# Patient Record
Sex: FEMALE | Race: BLACK OR AFRICAN AMERICAN | NOT HISPANIC OR LATINO | Employment: OTHER | URBAN - METROPOLITAN AREA
[De-identification: names, ages, dates, MRNs, and addresses within clinical notes are randomized per-mention and may not be internally consistent; named-entity substitution may affect disease eponyms.]

---

## 2023-09-02 ENCOUNTER — HOSPITAL ENCOUNTER (EMERGENCY)
Facility: MEDICAL CENTER | Age: 58
End: 2023-09-02
Attending: EMERGENCY MEDICINE
Payer: COMMERCIAL

## 2023-09-02 ENCOUNTER — APPOINTMENT (OUTPATIENT)
Dept: RADIOLOGY | Facility: MEDICAL CENTER | Age: 58
End: 2023-09-02
Attending: EMERGENCY MEDICINE
Payer: COMMERCIAL

## 2023-09-02 VITALS
TEMPERATURE: 98.3 F | DIASTOLIC BLOOD PRESSURE: 68 MMHG | HEART RATE: 49 BPM | BODY MASS INDEX: 55.32 KG/M2 | SYSTOLIC BLOOD PRESSURE: 154 MMHG | RESPIRATION RATE: 17 BRPM | OXYGEN SATURATION: 96 % | HEIGHT: 61 IN | WEIGHT: 293 LBS

## 2023-09-02 DIAGNOSIS — R07.9 CHEST PAIN, UNSPECIFIED TYPE: ICD-10-CM

## 2023-09-02 DIAGNOSIS — R55 PRE-SYNCOPE: ICD-10-CM

## 2023-09-02 LAB
ALBUMIN SERPL BCP-MCNC: 4.4 G/DL (ref 3.2–4.9)
ALBUMIN/GLOB SERPL: 1.4 G/DL
ALP SERPL-CCNC: 98 U/L (ref 30–99)
ALT SERPL-CCNC: 12 U/L (ref 2–50)
ANION GAP SERPL CALC-SCNC: 11 MMOL/L (ref 7–16)
AST SERPL-CCNC: 19 U/L (ref 12–45)
BASOPHILS # BLD AUTO: 0.3 % (ref 0–1.8)
BASOPHILS # BLD: 0.03 K/UL (ref 0–0.12)
BILIRUB SERPL-MCNC: 0.3 MG/DL (ref 0.1–1.5)
BUN SERPL-MCNC: 18 MG/DL (ref 8–22)
CALCIUM ALBUM COR SERPL-MCNC: 9.4 MG/DL (ref 8.5–10.5)
CALCIUM SERPL-MCNC: 9.7 MG/DL (ref 8.5–10.5)
CHLORIDE SERPL-SCNC: 104 MMOL/L (ref 96–112)
CO2 SERPL-SCNC: 26 MMOL/L (ref 20–33)
CREAT SERPL-MCNC: 0.96 MG/DL (ref 0.5–1.4)
EKG IMPRESSION: NORMAL
EOSINOPHIL # BLD AUTO: 0.09 K/UL (ref 0–0.51)
EOSINOPHIL NFR BLD: 1 % (ref 0–6.9)
ERYTHROCYTE [DISTWIDTH] IN BLOOD BY AUTOMATED COUNT: 49 FL (ref 35.9–50)
GFR SERPLBLD CREATININE-BSD FMLA CKD-EPI: 69 ML/MIN/1.73 M 2
GLOBULIN SER CALC-MCNC: 3.2 G/DL (ref 1.9–3.5)
GLUCOSE SERPL-MCNC: 95 MG/DL (ref 65–99)
HCT VFR BLD AUTO: 41.1 % (ref 37–47)
HGB BLD-MCNC: 14 G/DL (ref 12–16)
IMM GRANULOCYTES # BLD AUTO: 0.02 K/UL (ref 0–0.11)
IMM GRANULOCYTES NFR BLD AUTO: 0.2 % (ref 0–0.9)
LYMPHOCYTES # BLD AUTO: 4.05 K/UL (ref 1–4.8)
LYMPHOCYTES NFR BLD: 44.6 % (ref 22–41)
MCH RBC QN AUTO: 32.7 PG (ref 27–33)
MCHC RBC AUTO-ENTMCNC: 34.1 G/DL (ref 32.2–35.5)
MCV RBC AUTO: 96 FL (ref 81.4–97.8)
MONOCYTES # BLD AUTO: 0.59 K/UL (ref 0–0.85)
MONOCYTES NFR BLD AUTO: 6.5 % (ref 0–13.4)
NEUTROPHILS # BLD AUTO: 4.31 K/UL (ref 1.82–7.42)
NEUTROPHILS NFR BLD: 47.4 % (ref 44–72)
NRBC # BLD AUTO: 0 K/UL
NRBC BLD-RTO: 0 /100 WBC (ref 0–0.2)
PLATELET # BLD AUTO: 294 K/UL (ref 164–446)
PMV BLD AUTO: 9.1 FL (ref 9–12.9)
POTASSIUM SERPL-SCNC: 4.1 MMOL/L (ref 3.6–5.5)
PROT SERPL-MCNC: 7.6 G/DL (ref 6–8.2)
RBC # BLD AUTO: 4.28 M/UL (ref 4.2–5.4)
SODIUM SERPL-SCNC: 141 MMOL/L (ref 135–145)
TROPONIN T SERPL-MCNC: 8 NG/L (ref 6–19)
TROPONIN T SERPL-MCNC: 9 NG/L (ref 6–19)
WBC # BLD AUTO: 9.1 K/UL (ref 4.8–10.8)

## 2023-09-02 PROCEDURE — 99285 EMERGENCY DEPT VISIT HI MDM: CPT

## 2023-09-02 PROCEDURE — 36415 COLL VENOUS BLD VENIPUNCTURE: CPT

## 2023-09-02 PROCEDURE — 71045 X-RAY EXAM CHEST 1 VIEW: CPT

## 2023-09-02 PROCEDURE — 84484 ASSAY OF TROPONIN QUANT: CPT

## 2023-09-02 PROCEDURE — 85025 COMPLETE CBC W/AUTO DIFF WBC: CPT

## 2023-09-02 PROCEDURE — 80053 COMPREHEN METABOLIC PANEL: CPT

## 2023-09-02 PROCEDURE — 93005 ELECTROCARDIOGRAM TRACING: CPT | Performed by: EMERGENCY MEDICINE

## 2023-09-02 ASSESSMENT — PAIN DESCRIPTION - DESCRIPTORS: DESCRIPTORS: OTHER (COMMENT)

## 2023-09-03 NOTE — ED NOTES
Discharge education provided. Patient verbalizes understanding. Patient A/0x4 and ambulatory to the lobby with a steady gait. Patient discharged home to self care.    
ERP at bedside discussing discharge   
ERP at bedside.   
PIV placed, blood collected and sent out   
Pt ambulatory up to bathroom and back to bed, reconnected to monitor   
X-ray at bedside   
Medical Assessment Completed on: 23-Oct-2022 09:13

## 2023-09-03 NOTE — ED TRIAGE NOTES
"Chief Complaint   Patient presents with    Chest Pain    Shortness of Breath    Dizziness    Headache       BIB EMS to [address], pt on monitor and in gown, labs drawn and sent. Pt coming from the [facility], reports she was on the slot machine and she reports a sudden onset of SOB, Chest pain and dizziness. Pt reports the chest pain is located on the central chest, reports it is a tightness with a SOB. Hx of HTN. Pt states she is from [place]. Hx of GERD. Reports never have experienced this symptoms before.  Pt reports the chest tightness increases with inspiration. Pt reports a severe headache that causes her head to \"shake.\" Per EMS on arrival pt's head was the only thing shaking at the time. EMS administered 100 mcg of fentanyl IV and 4mg of zofran IV. Pt reports the pain decreased after pain medication. Pt is awake oriented through shaking and is able to speak full sentences. Pt had an episode on arrival as well. A/O x 4, GCS 15.      EMS reports on arrival pt was hypertensive on scene of 201/90, then after pain medication EMS stated it was 160/74.    BP (!) 154/76   Pulse (!) 54   Resp (!) 24   Ht 1.549 m (5' 1\")   Wt (!) 136 kg (300 lb)   SpO2 96%   BMI 56.68 kg/m²    "

## 2023-09-03 NOTE — ED PROVIDER NOTES
"ER Provider Note    Scribed for Dr. Pal Joyner M.D. by Slim Pickering. 9/2/2023  9:01 PM    Primary Care Provider: Pcp Pt States None    CHIEF COMPLAINT  Chief Complaint   Patient presents with    Chest Pain    Shortness of Breath    Dizziness    Headache     HPI/ROS  OUTSIDE HISTORIAN(S):  Family Mother present at bedside.     Blessing Teran is a 57 y.o. female who presents to the ED for evaluation of chest pain onset prior to arrival. The patient was at the [facility] on a slot machine at onset of the chest pain, and she locates the pain in the center of her chest. The patient's mother states when EMS arrived, they took her daughter outside the casino in a wheelchair, where she then began to have persistent facial twitches. The patient describes the chest pain as \"tightening around her ribcage.\" The patient describes the chest pain as a 6/10.The patient reports associated shortness of breath, headache, and dizziness with the onset of the chest pain. The patient reports the headache is on the right side of her face and radiates to the left side.The patient denies nausea, vomiting, diarrhea, diaphoresis, rhinorrhea, diarrhea, constipation, or dysuria.The patient states the chest pain is exacerbated with inspiration. The patient reports slight alleviation of chest pain when she lies down. The patient denies taking medication to alleviate her headache.The patient reports a history of smoking cigarettes, averaging 3 a day.The patient denies regular use of alcohol. The patient has no known drug allergies.     PAST MEDICAL HISTORY  No past medical history noted.    SURGICAL HISTORY  No past surgical history noted.     FAMILY HISTORY  No family history noted.    SOCIAL HISTORY   The patient notes a history of tobacco use.     CURRENT MEDICATIONS  Previous Medications    No medications noted.       ALLERGIES  Patient has no known allergies.    PHYSICAL EXAM  BP (!) 154/76   Pulse (!) 54   Resp (!) 24   Ht " "1.549 m (5' 1\")   Wt (!) 136 kg (300 lb)   SpO2 96%   BMI 56.68 kg/m²   Constitutional: Alert in no apparent distress.  HENT: No signs of trauma, Bilateral external ears normal, Nose normal.   Eyes: Pupils are equal and reactive, Conjunctiva normal, Non-icteric.   Neck: Normal range of motion, No tenderness, Supple, No stridor.   Lymphatic: No lymphadenopathy noted.   Cardiovascular: Bradycardic, no murmurs.   Thorax & Lungs: Normal breath sounds, No respiratory distress, No wheezing, No chest tenderness.   Abdomen: Bowel sounds normal, Soft, No tenderness, No masses, No pulsatile masses. No peritoneal signs.  Skin: Warm, Dry, No erythema, No rash.   Back: No bony tenderness, No CVA tenderness.   Extremities: Intact distal pulses, No edema, No tenderness, No cyanosis.  Musculoskeletal: Good range of motion in all major joints. No tenderness to palpation or major deformities noted.   Neurologic: Alert , Normal motor function, Normal sensory function, No focal deficits noted.   Psychiatric: Affect normal, Judgment normal, Mood normal.     DIAGNOSTIC STUDIES & PROCEDURES    Labs:   Labs Reviewed   CBC WITH DIFFERENTIAL - Abnormal; Notable for the following components:       Result Value    Lymphocytes 44.60 (*)     All other components within normal limits    Narrative:     Biotin intake of greater than 5 mg per day may interfere with  troponin levels, causing false low values.   COMP METABOLIC PANEL    Narrative:     Biotin intake of greater than 5 mg per day may interfere with  troponin levels, causing false low values.   TROPONIN    Narrative:     Biotin intake of greater than 5 mg per day may interfere with  troponin levels, causing false low values.   TROPONIN    Narrative:     Biotin intake of greater than 5 mg per day may interfere with  troponin levels, causing false low values.   ESTIMATED GFR    Narrative:     Biotin intake of greater than 5 mg per day may interfere with  troponin levels, causing false low " values.     All labs reviewed by me.    EKG Interpretation:  Interpreted by me    12 Lead EKG interpreted by me to show:  Normal sinus rhythm  Rate 57  Axis: Normal  Intervals: Normal  Normal T waves  Normal ST segments  My impression of this EKG: Does not indicate ischemia or arrhythmia at this time.     Radiology:   The attending Emergency Physician has independently interpreted the diagnostic imaging associated with this visit and is awaiting the final reading from the radiologist, which will be displayed below.    Preliminary interpretation is a follows: No pneumonia  Radiologist interpretation:     DX-CHEST-PORTABLE (1 VIEW)   Final Result         1. No acute cardiopulmonary abnormalities are identified.           COURSE & MEDICAL DECISION MAKING    ED Observation Status? Yes; I am placing the patient in to an observation status due to a diagnostic uncertainty as well as therapeutic intensity. Patient placed in observation status at 9:12 PM, 9/2/2023.     Observation plan is as follows: We will manage their symptoms, evaluate with diagnostic testing, and then reassess after results are reviewed.    Upon Reevaluation, the patient's condition has: Improved; and will be discharged.    Patient discharged from ED Observation status at 11:55 PM (Time) 9/3/2023 (Date).     INITIAL ASSESSMENT AND PLAN  Care Narrative: Patient with chest pain.  The patient also has headache.  Chest pain appears to be not concerning.  We will get 2 troponins.  We will image the patient's chest.  Will reassess after this.      9:01 PM - Patient seen and evaluated at bedside. Discussed plan of care, including lab work and diagnostic imaging. Patient agrees to plan of care. Ordered EKG, Troponins, CMP, CBC w/ diff, and Troponins in 2 hours to evaluate.    11:55 PM - I reviewed the results of the patient's lab work and imaging. I informed the patient of my plan to discharge. I instructed the patient to follow up with [facility]  for new or worsening symptoms.     I independently evaluated the patient and repeated the important components of the history and physical. I discussed the management with the resident. I have reviewed and agree with the pertinent clinical information as above including history, exam, study findings and recommendations.      ADDITIONAL PROBLEM LIST AND DISPOSITION   Patient feeling improved.  The patient has 2 negative troponins.  The patient has a heart score of 1.  There is no ischemia on EKG.  The patient is feeling improved.  I believe likely some of the headache is related to dehydration.  We recommend that she go back to home and rehydrate and was given strict return precautions and follow-up.               DISPOSITION AND DISCUSSIONS    Barriers to care at this time, including but not limited to: Patient does not have established PCP.     The patient will return for new or worsening symptoms and is stable at the time of discharge.    The patient is referred to a primary physician for blood pressure management, diabetic screening, and for all other preventative health concerns.    DISPOSITION:  Patient will be discharged home in stable condition.    FOLLOW UP:  [facility]  [address] [facility] [address]  [phone]        FINAL IMPRESSION   1. Chest pain, unspecified type    2. Pre-syncope       Slim JANSEN (Jesus Manuel), am scribing for, and in the presence of, Pal Joyner M.D..    Electronically signed by: Slim Gonzalez), 9/2/2023    Pal JANSEN M.D. personally performed the services described in this documentation, as scribed by Slim Pickering in my presence, and it is both accurate and complete.    The note accurately reflects work and decisions made by me.  Pal Joyner M.D.  9/3/2023  7:53 PM